# Patient Record
Sex: FEMALE | Race: WHITE | NOT HISPANIC OR LATINO | URBAN - METROPOLITAN AREA
[De-identification: names, ages, dates, MRNs, and addresses within clinical notes are randomized per-mention and may not be internally consistent; named-entity substitution may affect disease eponyms.]

---

## 2018-02-14 ENCOUNTER — COMMUNICATION - HEALTHEAST (OUTPATIENT)
Dept: SCHEDULING | Facility: CLINIC | Age: 33
End: 2018-02-14

## 2018-02-23 ENCOUNTER — OFFICE VISIT - HEALTHEAST (OUTPATIENT)
Dept: FAMILY MEDICINE | Facility: CLINIC | Age: 33
End: 2018-02-23

## 2018-02-23 DIAGNOSIS — S29.012A STRAIN OF RHOMBOID MUSCLE, INITIAL ENCOUNTER: ICD-10-CM

## 2018-02-23 DIAGNOSIS — M53.3 SI (SACROILIAC) JOINT DYSFUNCTION: ICD-10-CM

## 2018-02-23 DIAGNOSIS — J31.0 RHINITIS: ICD-10-CM

## 2018-02-23 DIAGNOSIS — J34.2 DEVIATED SEPTUM: ICD-10-CM

## 2018-02-23 DIAGNOSIS — Z13.228 SCREENING FOR METABOLIC DISORDER: ICD-10-CM

## 2018-02-23 DIAGNOSIS — Z00.00 ROUTINE GENERAL MEDICAL EXAMINATION AT A HEALTH CARE FACILITY: ICD-10-CM

## 2018-02-23 LAB
CHOLEST SERPL-MCNC: 164 MG/DL
FASTING STATUS PATIENT QL REPORTED: YES
HBA1C MFR BLD: 5.5 % (ref 3.5–6.1)
HDLC SERPL-MCNC: 46 MG/DL
LDLC SERPL CALC-MCNC: 106 MG/DL
TRIGL SERPL-MCNC: 58 MG/DL

## 2018-02-23 ASSESSMENT — MIFFLIN-ST. JEOR: SCORE: 1281

## 2018-02-26 LAB
HPV SOURCE: NORMAL
HUMAN PAPILLOMA VIRUS 16 DNA: NEGATIVE
HUMAN PAPILLOMA VIRUS 18 DNA: NEGATIVE
HUMAN PAPILLOMA VIRUS FINAL DIAGNOSIS: NORMAL
HUMAN PAPILLOMA VIRUS OTHER HR: NEGATIVE
SPECIMEN DESCRIPTION: NORMAL

## 2018-02-27 ENCOUNTER — COMMUNICATION - HEALTHEAST (OUTPATIENT)
Dept: FAMILY MEDICINE | Facility: CLINIC | Age: 33
End: 2018-02-27

## 2018-03-01 ENCOUNTER — COMMUNICATION - HEALTHEAST (OUTPATIENT)
Dept: PEDIATRICS | Facility: CLINIC | Age: 33
End: 2018-03-01

## 2018-03-01 LAB
BKR LAB AP ABNORMAL BLEEDING: NO
BKR LAB AP BIRTH CONTROL/HORMONES: NORMAL
BKR LAB AP CERVICAL APPEARANCE: NORMAL
BKR LAB AP GYN ADEQUACY: NORMAL
BKR LAB AP GYN INTERPRETATION: NORMAL
BKR LAB AP HPV REFLEX: NORMAL
BKR LAB AP LMP: NORMAL
BKR LAB AP PATIENT STATUS: NORMAL
BKR LAB AP PREVIOUS ABNORMAL: NORMAL
BKR LAB AP PREVIOUS NORMAL: 2013
HIGH RISK?: NO
PATH REPORT.COMMENTS IMP SPEC: NORMAL
RESULT FLAG (HE HISTORICAL CONVERSION): NORMAL

## 2020-03-15 ENCOUNTER — VIRTUAL VISIT (OUTPATIENT)
Dept: FAMILY MEDICINE | Facility: OTHER | Age: 35
End: 2020-03-15

## 2020-03-20 NOTE — PROGRESS NOTES
"Date: 03/15/2020 15:02:38  Clinician: Marya Dolan  Clinician NPI: [id number]  Patient: Megan Severson  Patient : 1985  Patient Address: [address] [place] [address]  Patient Phone: [phone]  Visit Protocol: URI  Patient Summary:  Hailey is a 34 year old ( : 1985 ) female who initiated a Visit for COVID-19 (Coronavirus) evaluation and screening. When asked the question \"Please sign me up to receive news, health information and promotions. \", Hailey responded \"No\".    Hailey states her symptoms started 1-2 days ago.   Her symptoms consist of malaise, a headache, rhinitis, myalgia, chills, a cough, and nasal congestion. She is experiencing mild difficulty breathing with activities but can speak normally in full sentences. Hailey also feels feverish.   Symptom details     Nasal secretions: The color of her mucus is clear.    Cough: Hailey coughs every 5-10 minutes and her cough is not more bothersome at night. Phlegm comes into her throat when she coughs. She believes her cough is caused by post-nasal drip. The color of the phlegm is clear.     Temperature: Her current temperature is 100.4 degrees Fahrenheit. Hailey has had a temperature over 100 degrees Fahrenheit for 1-2 days.     Headache: She states the headache is mild (1-3 on a 10 point pain scale).      Hailey denies having ear pain, enlarged lymph nodes, facial pain or pressure, wheezing, sore throat, and teeth pain. She also denies having a sinus infection within the past year, having recent facial or sinus surgery in the past 60 days, and taking antibiotic medication for the symptoms.   Precipitating events  She has not recently been exposed to someone with influenza. Hailey has been in close contact with the following high risk individuals: children under the age of 5.   Pertinent COVID-19 (Coronavirus) information  Hailey has not traveled internationally or to the areas where COVID-19 (Coronavirus) is widespread in the last 14 days " before the start of her symptoms.   Hailey has not had close contact with a suspected or laboratory-confirmed COVID-19 patient within 14 days of symptom onset.   Hailey is not a healthcare worker and does not work in a healthcare facility.   Pertinent medical history  Hailey does not get yeast infections when she takes antibiotics.   Hailey does not need a return to work/school note.   Weight: 145 lbs   Hailey does not smoke or use smokeless tobacco.   She denies pregnancy and denies breastfeeding. She does not menstruate.   Weight: 145 lbs    MEDICATIONS: Daytime-Nighttime Cold-Flu oral, ALLERGIES: Penicillins  Clinician Response:  Dear Hailey,  Based on the information provided, you have a viral upper respiratory infection, otherwise known as a cold. Symptoms vary from person to person, but can include sneezing, coughing, a runny nose, sore throat, and headache and range from mild to severe.  Unfortunately, there are no medications that can cure a cold, so treatment is focused on controlling symptoms as much as possible. Most people gradually feel better until symptoms are gone in 1-2 weeks.  Medication information  Because you have a viral infection, antibiotics will not help you get better. Treating a viral infection with antibiotics could actually make you feel worse.  Self care  The following tips will keep you as comfortable as possible while you recover:     Rest    Drink plenty of water and other liquids    Take a hot shower to loosen congestion    Take a spoonful of honey to reduce your cough     When to seek care  Please be seen in a clinic or urgent care if new symptoms develop, or symptoms become worse.  Additional treatment plan   Matias Hart,  Based on the information you have provided, it does not appear you need Coronavirus (COVID-19) testing.   At this time, we recommend testing primarily for those people who have symptoms of cough and fever and have either traveled to a known area of infection or have  been exposed to someone with laboratory confirmed Coronavirus by close contact.   Coronavirus - General Information:   The coronavirus infection starts within 14 days of an exposure.  Symptoms are those of a respiratory infection (such as fever, cough).   If you have not had symptoms by day 15, you should be considered uninfected by coronavirus.   Coronavirus - Symptoms:    The coronavirus can cause a respiratory illness, such as bronchitis or pneumonia.  The most common symptoms are: cough, fever, and shortness of breath.   Other symptoms are: body aches, chills, diarrhea, fatigue, headache, runny nose, and sore throat   Coronavirus - Exposure Risk Factors:   Exposure to a person who has been diagnosed with coronavirus.  Travel from an area with recent local transmission of coronavirus.  The CDC (www.cdc.gov) has the most up-to-date list of where the coronavirus outbreak is occurring.   Coronavirus - Spreading:    The virus likely spreads through respiratory droplets produced when a person coughs or sneezes. These respiratory droplets can travel approximately 6 feet and can remain on surfaces. Common disinfectants will kill the virus.  The CDC currently does not recommend healthy people wear masks.   Coronavirus - Protect Yourself:    Avoid close contact with people known to have this new coronavirus infection.  Wash hands often with soap and water or alcohol-based hand .  Avoid touching the eyes, nose or mouth.   Thank you for limiting contact with others, wearing a simple mask to cover your cough, practice good hand hygiene habits and accessing our virtual services where possible to limit the spread of this virus.  For more information about COVID19 and options for caring for yourself at home, please visit the CDC website at https://www.cdc.gov/coronavirus/2019-ncov/about/steps-when-sick.html   For more options for care at [facility], please visit our website at  [email or web address]     Diagnosis: Cough  Diagnosis ICD: R05

## 2021-05-11 ENCOUNTER — OFFICE VISIT - HEALTHEAST (OUTPATIENT)
Dept: FAMILY MEDICINE | Facility: CLINIC | Age: 36
End: 2021-05-11

## 2021-05-11 DIAGNOSIS — J06.9 VIRAL URI: ICD-10-CM

## 2021-05-11 RX ORDER — OXYMETAZOLINE HYDROCHLORIDE 0.05 G/100ML
2 SPRAY NASAL 2 TIMES DAILY
Qty: 15 ML | Refills: 0 | Status: SHIPPED | OUTPATIENT
Start: 2021-05-11

## 2021-06-01 VITALS — BODY MASS INDEX: 23 KG/M2 | WEIGHT: 134.7 LBS | HEIGHT: 64 IN

## 2021-06-11 ENCOUNTER — COMMUNICATION - HEALTHEAST (OUTPATIENT)
Dept: FAMILY MEDICINE | Facility: CLINIC | Age: 36
End: 2021-06-11

## 2021-06-16 NOTE — PROGRESS NOTES
Dear Hailey,    Your recent Pap smear result came back within normal limits including negative for presence of any high risk viruses which are responsible for cervical cancer , we will plan to repeat the pap smear in next  5 yr. Frequency of Pap smear will stay the same until unless there is change in sexual history. Please feel free to call if you have any concerns or questions.    Louise Celeste MD 3/1/2018 11:03 AM

## 2021-06-18 NOTE — PATIENT INSTRUCTIONS - HE
Patient Instructions by Shelby Mccoy PA-C at 5/11/2021  1:28 PM     Author: Shelby Mccoy PA-C Service: -- Author Type: Physician Assistant    Filed: 5/11/2021  1:29 PM Encounter Date: 5/11/2021 Status: Signed    : Shelby Mccoy PA-C (Physician Assistant)         Dear Megan L Severson    After reviewing your responses, I've been able to diagnose you with?an upper respiratory infection (common cold). Sinus infection generally develop after 10 days of having upper respiratory symptoms so antibiotics are not indicated at this time.?     Based on your responses and diagnosis, I have prescribed Afrin nasal spray to treat your symptoms. I have sent this to your pharmacy.?     It is also important to stay well hydrated, get lots of rest and take over-the-counter decongestants,?tylenol?or ibuprofen if you?are able to?take those medications per your primary care provider to help relieve discomfort.?     It is important that you take?all of?your prescribed medication even if your symptoms are improving after a few doses.? Taking?all of?your medicine helps prevent the symptoms from returning.?     If your symptoms worsen, you develop severe headache, vomiting, high fever (>102), or are not improving in 7 days, please contact your primary care provider for an appointment or visit any of our convenient Walk-in Care or Urgent Care Centers to be seen which can be found on our website?here.?     Thanks again for choosing?us?as your health care partner,?   ?  Shelby Mccoy?     You may want to try a nasal lavage (also known as nasal irrigation). You can find over-the-counter products, such as Neti-Pot, at retail locations or make your own at home. Instructions for homemade nasal lavage and more information on the process are available online at https://www.[employer].Moki.tv/contents/image?imageKey=PI%4I64627  and   https://www.[employer].Desk/contents/rinsing-out-your-nose-with-salt-water-the-basics?jgsdkBll=7690&source=see_link    Viral Upper Respiratory Illness (Adult)  You have a viral upper respiratory illness (URI), which is another term for the common cold. This illness is contagious during the first few days. It is spread through the air by coughing and sneezing. It may also be spread by direct contact (touching the sick person and then touching your own eyes, nose, or mouth). Frequent handwashing will decrease risk of spread. Most viral illnesses go away within 7 to 10 days with rest and simple home remedies. Sometimes the illness may last for several weeks. Antibiotics will not kill a virus, and they are generally not prescribed for this condition.    Home care    If symptoms are severe, rest at home for the first 2 to 3 days. When you resume activity, don't let yourself get too tired.    Avoid being exposed to cigarette smoke (yours or others).    You may use acetaminophen or ibuprofen to control pain and fever, unless another medicine was prescribed. If you have chronic liver or kidney disease, have ever had a stomach ulcer or gastrointestinal bleeding, or are taking blood-thinning medicines, talk with your healthcare provider before using these medicines. Aspirin should never be given to anyone under 18 years of age who is ill with a viral infection or fever. It may cause severe liver or brain damage.    Your appetite may be poor, so a light diet is fine. Avoid dehydration by drinking 6 to 8 glasses of fluids per day (water, soft drinks, juices, tea, or soup). Extra fluids will help loosen secretions in the nose and lungs.    Over-the-counter cold medicines will not shorten the length of time youre sick, but they may be helpful for the following symptoms: cough, sore throat, and nasal and sinus congestion. (Note: Do not use decongestants if you have high blood pressure.)  Follow-up care  Follow up with your healthcare  provider, or as advised.  When to seek medical advice  Call your healthcare provider right away if any of these occur:    Cough with lots of colored sputum (mucus)    Severe headache; face, neck, or ear pain    Difficulty swallowing due to throat pain    Fever of 100.4 F (38 C) or higher, or as directed by your healthcare provider  Call 911  Call 911 if any of these occur:    Chest pain, shortness of breath, wheezing, or difficulty breathing    Coughing up blood    Inability to swallow due to throat pain  Date Last Reviewed: 9/13/2015 2000-2017 [employer]. [address] [place] [id number]. All rights reserved. This information is not intended as a substitute for professional medical care. Always follow your healthcare professional's instructions.

## 2021-06-25 NOTE — TELEPHONE ENCOUNTER
Called and informed Pt that the appointment she had scheduled is wrong and got her re scheduled

## 2021-06-26 NOTE — PROGRESS NOTES
Progress Notes by Louise Celeste MD at 2/23/2018  8:00 AM     Author: Louise Celeste MD Service: -- Author Type: Physician    Filed: 2/23/2018  2:23 PM Encounter Date: 2/23/2018 Status: Signed    : Louise Celeste MD (Physician)       Assessment:   Megan L Severson is a 32 y.o. female seen for routine physical with  Annual Exam (pt is fasting, pap, pt reports back pain lower going thru legs and left shoulder going down arm)    Hailey was seen today for annual exam.    Diagnoses and all orders for this visit:    Routine general medical examination at a health care facility  -     Gynecologic Cytology (PAP Smear)    Screening for metabolic disorder  -     JIC RED  -     JIC LAV  -     Lipid Cascade  -     Gynecologic Cytology (PAP Smear)  -     Glycosylated Hemoglobin A1c    SI (sacroiliac) joint dysfunction  Comments:  on exam tenderness at Right SI joint staight leg raise neg for pain . stretches education done if no improvement will offer PT    Strain of rhomboid muscle, initial encounter  Comments:  stretches explaine to the patient .     Deviated septum    Rhinitis    Other orders  -     fluticasone (FLONASE ALLERGY RELIEF) 50 mcg/actuation nasal spray; 2 spray each side of the nose at bedtime        Plan:      Education reviewed: calcium supplements, depression evaluation, self breast exams, skin cancer screening and weight bearing exercise.  Contraception: IUD.        Plan per medications  and orders     Subjective:       Megan L Severson is a 32 y.o.who presents for an annual exam.  The patient reports that there is not domestic violence in Patient  life.  New problem discussed today : back and upper extremities and lower extremities shooting pain     Back Pain: Patient presents for presents evaluation of low back problems.  Symptoms have been present for 2 week and include numbness in left shoulder and rt hip and back area , pain in back (sharp and shooting in character; 5/10 in severity) and stiffness in  back and shoulder and neck area . Initial inciting event: none  But patient just started using sit and stand station and balance ball and does have 1 yr old at home  Symptoms are worst: afternoon, nighttime. Alleviating factors identifiable by patient are walking and changing position . Exacerbating factors identifiable by patient are sitting and laying down . Treatments so far initiated by patient: none Previous lower back problems: none. Previous workup: none. Previous treatments: none.      Healthy Habits:   Regular Exercise: No  Sunscreen Use: Yes  Healthy Diet: trying  Dental Visits Regularly: Yes  Seat Belt: Yes  Sexually active: Yes  Self Breast Exam Monthly:Yes  Hemoccults: N/A  Flex Sig: N/A  Colonoscopy: N/A  Lipid Profile: Yes  Glucose Screen: Yes  Prevention of Osteoporosis: N/A  Last Dexa: N/A  Guns at Home:  No         Gynecologic History  No LMP recorded. Patient is not currently having periods (Reason: Contraception).  Contraception: IUD  Last Pap: . Results were: normal  Last mammogram: NA.     OB History    Para Term  AB Living   2 2 2   1   SAB TAB Ectopic Multiple Live Births      0 1      # Outcome Date GA Lbr Jarod/2nd Weight Sex Delivery Anes PTL Lv   2 Term 16 39w0d 07:17 / 00:07 8 lb 1 oz (3.657 kg) M Vag-Spont OTHER N ASIF   1 Term                   Current Outpatient Prescriptions   Medication Sig Dispense Refill   ? levonorgestrel (MIRENA) 20 mcg/24 hr (5 years) IUD 1 each by Intrauterine route once.     ? fluticasone (FLONASE ALLERGY RELIEF) 50 mcg/actuation nasal spray 2 spray each side of the nose at bedtime 16 g 12     No current facility-administered medications for this visit.      No past medical history on file.  No past surgical history on file.  Penicillins  No family history on file.    Social History     Social History Narrative     to mari     2 kids 4yr old girl Mildred    And 1yr old boy John    Work in Human resource     Family history of malignant  "hyperthermia         Louise Celeste MD 2/23/2018 10:06 AM             Review of Systems  General:  Denies problem  Eyes: Denies problem  Ears/Nose/Throat: Denies problem  Cardiovascular: Denies problem  Respiratory:  Denies problem  Gastrointestinal:  Denies problem, Genitourinary: Denies problem  Musculoskeletal:  Denies problem  Skin: Denies problem  Neurologic: Denies problem  Psychiatric: Denies problem  Endocrine: Denies problem  Heme/Lymphatic: Denies problem   Allergic/Immunologic: Denies problem         Objective:         Vitals:    02/23/18 0800   BP: 100/62   Pulse: 84   Weight: 134 lb 11.2 oz (61.1 kg)   Height: 5' 4\" (1.626 m)       Physical Exam:  General Appearance: Alert, cooperative, no distress, appears stated age  Head: Normocephalic, without obvious abnormality, atraumatic  Eyes: PERRL, conjunctiva/corneas clear, EOM's intact  Ears: Normal TM's and external ear canals, both ears  Nose: Nares normal, septum midline,mucosa normal, no drainage  Throat: Lips, mucosa, and tongue normal; teeth and gums normal  Neck: Supple, symmetrical, trachea midline, no adenopathy;  thyroid: not enlarged, symmetric, no tenderness/mass/nodules; no carotid bruit or JVD  Back: Symmetric, no curvature, ROM normal, no CVA tenderness  Lungs: Clear to auscultation bilaterally, respirations unlabored  Breasts: No breast masses, tenderness, asymmetry, or nipple discharge.  Heart: Regular rate and rhythm, S1 and S2 normal, no murmur, rub, or gallop, Abdomen: Soft, non-tender, bowel sounds active all four quadrants,  no masses, no organomegaly  Pelvic:Normally developed genitalia with no external lesions or eruptions. Vagina and cervix show no lesions, inflammation, discharge or tenderness. No cystocele, No rectocele. Uterus within normal limits IUD in place .  No adnexal mass or tenderness.    Extremities: Extremities normal, atraumatic, no cyanosis or edema  Back : pain at retrotonsillar abscess sacroiliac joint range of motion " intact   Also trigger point left scapular and cervical area range of motion at shoulder neck within normal limits   Skin: Skin color, texture, turgor normal, no rashes or lesions  Lymph nodes: Cervical, supraclavicular, and axillary nodes normal  Neurologic: Normal       Results for orders placed or performed in visit on 02/23/18   Glycosylated Hemoglobin A1c   Result Value Ref Range    Hemoglobin A1c 5.5 3.5 - 6.1 %            Louise Celeste     Patient Instructions       Understanding Sacroiliac Strain    A joint is a place where 2 bones meet. The 2 sacroiliac joints are where the hip (iliac) bones meet the bottom part of the spine (sacrum). These joints are surrounded by muscle, connective tissue, and nerves. Normally, a sacroiliac joint (SIJ) does not move very much. But it can be pushed out of alignment. The tissues around an SIJ also can be stretched or torn. This can lead to pain in the low back.     How to say it  [id number] strain   Causes of sacroiliac strain  Causes of SIJ strain can include:    Stress on the SIJ from lifting weight incorrectly    Poor body mechanics and posture during sports or work activities    Damage from degenerative diseases such as arthritis    Increased pressure on the SIJ from pregnancy  Symptoms of sacroiliac strain  Symptoms of SIJ strain may include:    Aching in the low back, buttocks, or upper leg    Pain that gets worse with movement or standing for a long time, and gets better with rest    Inability to move as freely as usual    Muscle spasms in the low back  Treating sacroiliac strain  Treatment focuses on reducing pain and avoiding further injury. Treatments may include:    Prescription or over-the-counter pain medicines. These help reduce pain and swelling.    Cold packs or heat packs. These help reduce pain and swelling.    Stretching and other exercises. These improve flexibility and strength.    Physical therapy. This may include exercises or other  treatments.    An SIJ belt. This medical device is worn around the hips, to make the SIJ more stable and reduce pain.    Injections of medicine. This may relieve symptoms.  Possible complications of sacroiliac strain  If the cause of the pain is not addressed, symptoms may return or get worse. Follow your healthcare providers instructions on lifestyle changes and treating your SIJ strain.     When to call your healthcare provider  Call your healthcare provider right away if you have any of these:    Fever of 100.4 F (38 C) or higher, or as directed    Redness or swelling    Pain that gets worse    Symptoms that dont get better with prescribed medicines, or get worse    New symptoms   Date Last Reviewed: 3/10/2016    [id number] [employer]. [address] [place] [id number]. All rights reserved. This information is not intended as a substitute for professional medical care. Always follow your healthcare professional's instructions.        Shoulder Girdle Stretch      To start, sit in a chair with your feet flat on the floor. Your weight should be slightly forward so that youre balanced evenly on your buttocks. Relax your shoulders and keep your head level. Using a chair with arms may help you keep your balance:    Place 1 hand on the outside elbow of the other arm.    Pull the arm across your body. Hold for 30 to 60 seconds. Repeat once.    Switch sides.  For your safety, check with your healthcare provider before starting an exercise program.   Date Last Reviewed: 8/16/2015 2000-2016 [employer]. [address] [place] [id number]. All rights reserved. This information is not intended as a substitute for professional medical care. Always follow your healthcare professional's instructions.        Shoulder Squeeze Exercise      To start, sit in a chair with your feet flat on the floor. Shift your weight slightly forward to avoid rounding your back. Relax. Keep your ears,  shoulders, and hips aligned:    Raise your arms to shoulder height, elbows bent and palms forward.    Move your arms back, squeezing your shoulder blades together.    Hold for 10 seconds. Return to starting position.     Repeat 5 times.   For your safety, check with your healthcare provider before starting an exercise program.   Date Last Reviewed: 8/16/2015 2000-2016 [employer]. [address] [place] [address]. All rights reserved. This information is not intended as a substitute for professional medical care. Always follow your healthcare professional's instructions.        Shoulder Exercises      To start, sit in a chair with your feet flat on the floor. Your weight should be slightly forward so that youre balanced evenly on your buttocks. Relax your shoulders and keep your head level. Avoid arching your back or rounding your shoulders. Using a chair with arms may help you keep your balance.    Raise your arms, elbows bent, to shoulder height.    Slowly move your forearms together. Hold for 5 seconds.    Return to starting position. Repeat 5 times.  Date Last Reviewed: 10/1/2015    [id number] [employer]. [address] [place] [address]. All rights reserved. This information is not intended as a substitute for professional medical care. Always follow your healthcare professional's instructions.    Dear Hailey,    It was a pleasure to see you in the office today   We will follow up on all the labs if they are  done today and release to [employer] or send you the letter.  Please feel free to call or make follow up appointment as needed.  Thank you for coming and choosing our clinic for your care.    Plan to see you yearly for physical       General Recommendations for females ages 18-40 years old:not specific to your care     Maintaining a few good health-related habits can have a huge impact on your future health. Please consider the following general health  recommendations:    Eat a quality diet (generally, low in simple sugars, starches, cholesterol and saturated fat.)    If your diet contains less than 4 servings of dairy products each day, take a Calcium 600mg/Vit D 200IU tablet twice daily to help maintain your bone strength.    If you are considering pregnancy begin a supplemental vitamin with 1 mg of folic acid daily. Avoid alcohol when you think you might have conceived, as disability related to the fetal alcohol syndrome can occur with as few as 1 to 2 drinks if consumed at a critical time in your baby's development.    Never smoke. Avoid chewing tobacco.    Limit alcohol to no more than 1-2 in 24 hours, as higher use increases your weight, can damage your liver or pancreas, and increases triglycerides (fat in the blood). Never drink and drive.    Avoid the use of recreational drugs. Methamphetamine, for example, often causes addiction with the first use.    Exercise regularly. Ideally you would have 30 minutes of aerobic exercise at least 5 times weekly. Find something you enjoy and a friend to do it with you.    Maintain a healthy weight. Generally a BMI of 20-25 is considered ideal. Overweight is defined as 26-30, Obese is 30-35 and markedly obese is greater than 35.     Apply sun block (SPF 25 or greater) on exposed skin when you are out in the sun to prevent skin cancer.     Wear a seatbelt whenever you are in a car.    You should have a tetanus booster at least every 10 years.    Consider a flu shot every fall.    Your cholesterol should be checked annually if high or if you are overweight, once every 5 years if normal.    Your blood sugar should be tested yearly if you are overweight.    Do breast self-exam monthly, usually right after your menses is the best time.     You should have a pap smear every 1-3 years as advised by your health care provider     Consider testing for sexually transmitted infection (STI) if you have had more than 2 partners in  the last 5 years or have any other reason to believe you are at risk of infection. The best way to minimize your risk of STIs is to limit the number of partners you have and know their sexual history. Using condoms decreases your risk, but does not always prevent STIs.

## 2021-06-27 ENCOUNTER — HEALTH MAINTENANCE LETTER (OUTPATIENT)
Age: 36
End: 2021-06-27

## 2021-07-05 PROBLEM — Z84.89 FAMILY HISTORY OF MALIGNANT HYPERTHERMIA: Status: ACTIVE | Noted: 2021-06-29

## 2021-10-17 ENCOUNTER — HEALTH MAINTENANCE LETTER (OUTPATIENT)
Age: 36
End: 2021-10-17

## 2022-10-01 ENCOUNTER — HEALTH MAINTENANCE LETTER (OUTPATIENT)
Age: 37
End: 2022-10-01

## 2023-10-21 ENCOUNTER — HEALTH MAINTENANCE LETTER (OUTPATIENT)
Age: 38
End: 2023-10-21

## 2024-03-09 ENCOUNTER — HEALTH MAINTENANCE LETTER (OUTPATIENT)
Age: 39
End: 2024-03-09